# Patient Record
Sex: MALE | URBAN - METROPOLITAN AREA
[De-identification: names, ages, dates, MRNs, and addresses within clinical notes are randomized per-mention and may not be internally consistent; named-entity substitution may affect disease eponyms.]

---

## 2019-01-01 ENCOUNTER — OFFICE VISIT (OUTPATIENT)
Dept: URBAN - METROPOLITAN AREA CLINIC 76 | Facility: CLINIC | Age: 64
End: 2019-01-01
Payer: MEDICARE

## 2019-01-01 DIAGNOSIS — H40.51X2 GLAUCOMA SECONDARY TO OTHER EYE DISORDERS, RIGHT EYE, MODERATE STAGE: Primary | ICD-10-CM

## 2019-01-01 DIAGNOSIS — H25.13 AGE-RELATED NUCLEAR CATARACT, BILATERAL: ICD-10-CM

## 2019-01-01 DIAGNOSIS — E11.3591 TYPE 2 DIABETES MELLITUS W/ PROLIFERATIVE DIABETIC RETINOPATHY W/O MACULAR EDEMA, RIGHT EYE: ICD-10-CM

## 2019-01-01 PROCEDURE — 99213 OFFICE O/P EST LOW 20 MIN: CPT | Performed by: OPTOMETRIST

## 2019-01-01 PROCEDURE — 99202 OFFICE O/P NEW SF 15 MIN: CPT | Performed by: OPTOMETRIST

## 2019-01-01 PROCEDURE — 99204 OFFICE O/P NEW MOD 45 MIN: CPT | Performed by: OPHTHALMOLOGY

## 2019-01-01 RX ORDER — ACETAZOLAMIDE 250 MG/1
250 MG TABLET ORAL
Qty: 60 | Refills: 2 | Status: ACTIVE
Start: 2019-01-01

## 2019-01-01 RX ORDER — BRIMONIDINE TARTRATE, TIMOLOL MALEATE 2; 5 MG/ML; MG/ML
SOLUTION/ DROPS OPHTHALMIC
Qty: 5 | Refills: 6 | Status: INACTIVE
Start: 2019-01-01 | End: 2020-01-01

## 2019-01-01 RX ORDER — BIMATOPROST 0.1 MG/ML
0.01 % SOLUTION/ DROPS OPHTHALMIC
Qty: 2.5 | Refills: 6 | Status: INACTIVE
Start: 2019-01-01 | End: 2020-01-01

## 2019-01-01 ASSESSMENT — INTRAOCULAR PRESSURE
OS: 18
OS: 18
OD: 32
OD: 38
OD: 51

## 2019-12-03 NOTE — IMPRESSION/PLAN
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 most likely secondary to PDR. IOP OD too high. IOP OD improved after starting drops and pills. Plan: Discussed diagnosis in detail with patient. Recommend starting treatment, gave patient in office today. Acetazolamide 250mg 2 tabs @ 10:42, 1 drops of Combigan @ 10:43, 1 drop of Lumigan @ 10:46.  
Continue Lumigan QHS OD, Combigan BID OD, Acetazolamide 250mg 1 tab PO BID w/ meals

## 2019-12-03 NOTE — IMPRESSION/PLAN
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 presumed will need to re-eval once Hyphema clears Plan: Proliferative diabetic retinopathy. Discussed ocular and systemic benefits of maintaining blood sugar control.

## 2019-12-06 NOTE — IMPRESSION/PLAN
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 most likely secondary to PDR. IOP OD too high. IOP OD improved after starting drops and pills. Patient forgetful has not taken any meds today. Plan: Discussed diagnosis in detail with patient. Continue Lumigan QHS OD, Combigan BID OD, Acetazolamide 250mg 1 tab PO BID w/ meals. Advised compliance. 1 drop Combigan 0.2%-0.5% given in office.